# Patient Record
Sex: MALE | Race: OTHER | HISPANIC OR LATINO
[De-identification: names, ages, dates, MRNs, and addresses within clinical notes are randomized per-mention and may not be internally consistent; named-entity substitution may affect disease eponyms.]

---

## 2018-04-27 ENCOUNTER — RESULT REVIEW (OUTPATIENT)
Age: 78
End: 2018-04-27

## 2019-04-05 ENCOUNTER — APPOINTMENT (OUTPATIENT)
Dept: NEUROLOGY | Facility: CLINIC | Age: 79
End: 2019-04-05
Payer: MEDICARE

## 2019-04-05 VITALS
HEIGHT: 66 IN | DIASTOLIC BLOOD PRESSURE: 64 MMHG | WEIGHT: 172 LBS | BODY MASS INDEX: 27.64 KG/M2 | SYSTOLIC BLOOD PRESSURE: 120 MMHG | HEART RATE: 70 BPM

## 2019-04-05 DIAGNOSIS — Z78.9 OTHER SPECIFIED HEALTH STATUS: ICD-10-CM

## 2019-04-05 DIAGNOSIS — E78.00 PURE HYPERCHOLESTEROLEMIA, UNSPECIFIED: ICD-10-CM

## 2019-04-05 DIAGNOSIS — E11.9 TYPE 2 DIABETES MELLITUS W/OUT COMPLICATIONS: ICD-10-CM

## 2019-04-05 DIAGNOSIS — Z82.0 FAMILY HISTORY OF EPILEPSY AND OTHER DISEASES OF THE NERVOUS SYSTEM: ICD-10-CM

## 2019-04-05 DIAGNOSIS — Z82.3 FAMILY HISTORY OF STROKE: ICD-10-CM

## 2019-04-05 DIAGNOSIS — G31.84 MILD COGNITIVE IMPAIRMENT, SO STATED: ICD-10-CM

## 2019-04-05 DIAGNOSIS — I10 ESSENTIAL (PRIMARY) HYPERTENSION: ICD-10-CM

## 2019-04-05 PROCEDURE — 99204 OFFICE O/P NEW MOD 45 MIN: CPT

## 2019-04-05 RX ORDER — METFORMIN HYDROCHLORIDE 1000 MG/1
1000 TABLET, COATED ORAL
Refills: 0 | Status: ACTIVE | COMMUNITY

## 2019-04-05 RX ORDER — BISOPROLOL/HYDROCHLOROTHIAZIDE 2.5-6.25MG
2.5-6.25 TABLET ORAL
Refills: 0 | Status: ACTIVE | COMMUNITY

## 2019-04-05 RX ORDER — LOSARTAN POTASSIUM 100 MG/1
100 TABLET, FILM COATED ORAL
Refills: 0 | Status: ACTIVE | COMMUNITY

## 2019-04-05 NOTE — REASON FOR VISIT
[Consultation] : a consultation visit [Other: _____] : [unfilled] [FreeTextEntry1] : Referred by Dr. Rivera for evaluation of cognitive decline

## 2019-04-05 NOTE — DISCUSSION/SUMMARY
[FreeTextEntry1] : 79 -year-old male with PMH remarkable for hypertension, hypercholesterolemia, type 2 diabetes presents with c/o mild cognitive decline and slowing down both cognitively and of motor activity, takes longer time to finish tasks, son reports that he has had urinary incontinence since past 6 months.\par \par \par # Mild cognitive impairment/possibility of early dementia: MMSE 25/30\par \par - Have recommended labs: B12, folate and TSH\par - Obtain MRI of the brain\par - Perform full cognitive assessment in 6 weeks, we will make further recommendations after that\par \par # Very subtle parkinsonian features, at present examination not suggestive of Parkinson's disease.\par \par - Have recommended periodic Q3 month followups note evolution of symptoms\par - Patient advised about activities, should walk daily\par - Also recommended cognitive exercises and staying active in his physical and social active

## 2019-04-05 NOTE — PHYSICAL EXAM
[General Appearance - Alert] : alert [General Appearance - In No Acute Distress] : in no acute distress [Mood] : the mood was normal [Person] : oriented to person [Place] : oriented to place [Time] : disoriented to time [Short Term Intact] : short term memory impaired [Registration Intact] : recent registration memory intact [Span Intact] : the attention span was normal [Naming Objects] : no difficulty naming common objects [Repeating Phrases] : no difficulty repeating a phrase [Fluency] : fluency intact [Comprehension] : comprehension intact [Current Events] : inadequate knowledge of current events [Total Score ___ / 30] : the patient achieved a score of [unfilled] /30 [Date / Time ___ / 5] : date / time [unfilled] / 5 [Place ___ / 5] : place [unfilled] / 5 [Registration ___ / 3] : registration [unfilled] / 3 [Serial Sevens ___/5] : serial sevens [unfilled] / 5 [Naming 2 Objects ___ / 2] : naming two objects [unfilled] / 2 [Repeating a Sentence ___ / 1] : repeating a sentence [unfilled] / 1 [Writing a Sentence ___ / 1] : write sentence [unfilled] / 1 [3-stage Verbal Command ___ / 3] : three-stage verbal command [unfilled] / 3 [Written Command ___ / 1] : written command [unfilled] / 1 [Copy a Design ___ / 1] : copy a design [unfilled] / 1 [Recall ___ / 3] : recall [unfilled] / 3 [Cranial Nerves Optic (II)] : visual acuity intact bilaterally,  visual fields full to confrontation, pupils equal round and reactive to light [Cranial Nerves Oculomotor (III)] : extraocular motion intact [Cranial Nerves Trigeminal (V)] : facial sensation intact symmetrically [Cranial Nerves Facial (VII)] : face symmetrical [Cranial Nerves Vestibulocochlear (VIII)] : hearing was intact bilaterally [Cranial Nerves Glossopharyngeal (IX)] : tongue and palate midline [Cranial Nerves Accessory (XI - Cranial And Spinal)] : head turning and shoulder shrug symmetric [Motor Strength] : muscle strength was normal in all four extremities [Sensation Tactile Decrease] : light touch was intact [Romberg's Sign] : Romberg's sign was negtive [Vibration Decrease Leg / Foot Both Ankles] : decreased at both ankles [Vibration Decrease Leg / Foot Toes Both Feet] : decreased at the toes of both feet  [2+] : Brachioradialis left 2+ [1+] : Patella left 1+ [0] : Ankle jerk left 0 [FreeTextEntry1] : Subtle mask-like facies, no hypophonia or dysarthria, no axial muscle hypertonia.\par Mild-moderate hypertonia/rigidity left upper extremity, ? Bradykinesia left side, finger tapping equal.\par No rest or action tremors noted.\par Erect posture and normal gait, no asymmetry of arm swing [PERRL With Normal Accommodation] : pupils were equal in size, round, reactive to light, with normal accommodation [Extraocular Movements] : extraocular movements were intact [No APD] : no afferent pupillary defect [Full Visual Field] : full visual field [Outer Ear] : the ears and nose were normal in appearance [Hearing Threshold Finger Rub Not Thomas] : hearing was normal [Auscultation Breath Sounds / Voice Sounds] : lungs were clear to auscultation bilaterally [Heart Sounds] : normal S1 and S2 [Arterial Pulses Carotid] : carotid pulses were normal with no bruits [Edema] : there was no peripheral edema [No Spinal Tenderness] : no spinal tenderness [Involuntary Movements] : no involuntary movements were seen [] : no rash

## 2019-04-05 NOTE — HISTORY OF PRESENT ILLNESS
[FreeTextEntry1] : 79 -year-old male with PMH remarkable for hypertension, hypercholesterolemia and type 2 diabetes presents today for evaluation of mild cognitive decline noted since over a year.\par \par Patient states that he has not noted any change in decline in his cognition or functioning, he states he keeps himself busy; patient's son who is accompanying him reports the patient has had short-term memory problems, he repeats questions and gives same on source, he has also slowed down both cognitively as well as in his motor activity, takes longer time to finish tasks, son reports that he has had urinary incontinence since past 6 months.\par \par Patient denies sleep disturbance, nightmares/hallucinations, tremors, dysphagia, and changing speech or language, gait instability or falls.
asymptomatic

## 2019-04-05 NOTE — REVIEW OF SYSTEMS
[Recent Weight Gain (___ Lbs)] : recent [unfilled] ~Ulb weight gain [As Noted in HPI] : as noted in HPI [Memory Lapses or Loss] : memory loss [Decr. Concentrating Ability] : decreased concentrating ability [Repeating Questions] : repeated questioning about recent events [Poor Coordination] : poor coordination [Incontinence] : incontinence [Negative] : Heme/Lymph [FreeTextEntry4] : snoring

## 2019-04-10 ENCOUNTER — FORM ENCOUNTER (OUTPATIENT)
Age: 79
End: 2019-04-10

## 2019-04-11 ENCOUNTER — OUTPATIENT (OUTPATIENT)
Dept: OUTPATIENT SERVICES | Facility: HOSPITAL | Age: 79
LOS: 1 days | End: 2019-04-11
Payer: MEDICARE

## 2019-04-11 ENCOUNTER — APPOINTMENT (OUTPATIENT)
Dept: MRI IMAGING | Facility: CLINIC | Age: 79
End: 2019-04-11
Payer: MEDICARE

## 2019-04-11 DIAGNOSIS — Z00.8 ENCOUNTER FOR OTHER GENERAL EXAMINATION: ICD-10-CM

## 2019-04-11 PROCEDURE — 70551 MRI BRAIN STEM W/O DYE: CPT

## 2019-04-11 PROCEDURE — 70551 MRI BRAIN STEM W/O DYE: CPT | Mod: 26

## 2019-05-10 ENCOUNTER — APPOINTMENT (OUTPATIENT)
Dept: NEUROLOGY | Facility: CLINIC | Age: 79
End: 2019-05-10
Payer: MEDICARE

## 2019-05-10 VITALS
DIASTOLIC BLOOD PRESSURE: 60 MMHG | SYSTOLIC BLOOD PRESSURE: 120 MMHG | HEIGHT: 66 IN | BODY MASS INDEX: 26.84 KG/M2 | HEART RATE: 64 BPM | WEIGHT: 167 LBS

## 2019-05-10 PROCEDURE — 96132 NRPSYC TST EVAL PHYS/QHP 1ST: CPT | Mod: 59

## 2019-05-10 PROCEDURE — 99214 OFFICE O/P EST MOD 30 MIN: CPT | Mod: 25

## 2019-05-10 NOTE — DATA REVIEWED
[de-identified] : 4/11/19; MRI of brain - normal [de-identified] : 5/3/19: Total cholest 173, , HDL 45, , CMP, B12, TSH, vitamin D normal\par HbA1c 6.7

## 2019-05-10 NOTE — PROCEDURE
[FreeTextEntry1] :  Cognitive assessment ; global battery performed today.\par \par Global Cognitive score: 93.6\par \par More than one standard deviation below average in domains: visuospatial function 74.8, verbal function 81.3\par \par Below average in domains: Memory 85.4, attention 90.3,\par \par Above average in domains: executive func 107.6, information processing speed 100.3, motor skills 115.3

## 2019-05-10 NOTE — HISTORY OF PRESENT ILLNESS
[FreeTextEntry1] : Patient is here for a followup visit today, was last seen on 4/5/19. Patient continues to have issues with short-term memory, has slowed down, has no new complaints since last visit. Pts wife who is accompanying him, is concerned as he is working full-time, she would like him to cut down on his work and spend more time in [place] where she spends winter months and can supervise him, patient is reluctant, he stays in [place] during [place] with his son.\par \par MRI of brain was normal;\par labs B12, folate, TSH normal, A1c 6.7\par \par Patient is here for cognitive assessment today\par

## 2019-05-10 NOTE — DISCUSSION/SUMMARY
[FreeTextEntry1] : 79 -year-old male with PMH remarkable for hypertension, hypercholesterolemia, type 2 diabetes presents with c/o mild cognitive decline and slowing down both cognitively and of motor activity, takes longer time to finish tasks, son reports that he has had urinary incontinence since past 6 months.\par \par Cognitive assessment reveals global cognitive score 93.6 ; more than one SD below average noted in visuospatial function and verbal function, Below average in Memory and attention \par \par # Mild dementia; had a detailed discussion with the patient and his wife regarding cognitive assessment, he is willing to try donepezil, adverse effects of the medication discussed with him.\par \par - Start donepezil 5 mg daily, may increase dose to 10 mg in 10 weeks\par \par # Very subtle parkinsonian features, at present examination not suggestive of Parkinson's disease.\par \par - Have recommended periodic Q3 month followups note evolution of symptoms\par - Patient advised about activities, should walk daily\par - Also recommended cognitive exercises and staying active in his physical and social active

## 2019-05-10 NOTE — REASON FOR VISIT
[Procedure: _________] : a [unfilled] procedure visit [FreeTextEntry1] : to discuss MRI brain and lab work; for cognitive assessment

## 2019-05-10 NOTE — PHYSICAL EXAM
[General Appearance - Alert] : alert [General Appearance - In No Acute Distress] : in no acute distress [Mood] : the mood was normal [Person] : oriented to person [Place] : oriented to place [Span Intact] : the attention span was normal [Registration Intact] : recent registration memory intact [Naming Objects] : no difficulty naming common objects [Repeating Phrases] : no difficulty repeating a phrase [Comprehension] : comprehension intact [Fluency] : fluency intact [Total Score ___ / 30] : the patient achieved a score of [unfilled] /30 [Date / Time ___ / 5] : date / time [unfilled] / 5 [Registration ___ / 3] : registration [unfilled] / 3 [Place ___ / 5] : place [unfilled] / 5 [Serial Sevens ___/5] : serial sevens [unfilled] / 5 [Naming 2 Objects ___ / 2] : naming two objects [unfilled] / 2 [Repeating a Sentence ___ / 1] : repeating a sentence [unfilled] / 1 [Writing a Sentence ___ / 1] : write sentence [unfilled] / 1 [3-stage Verbal Command ___ / 3] : three-stage verbal command [unfilled] / 3 [Written Command ___ / 1] : written command [unfilled] / 1 [Copy a Design ___ / 1] : copy a design [unfilled] / 1 [Recall ___ / 3] : recall [unfilled] / 3 [Cranial Nerves Optic (II)] : visual acuity intact bilaterally,  visual fields full to confrontation, pupils equal round and reactive to light [Cranial Nerves Oculomotor (III)] : extraocular motion intact [Cranial Nerves Trigeminal (V)] : facial sensation intact symmetrically [Cranial Nerves Facial (VII)] : face symmetrical [Cranial Nerves Vestibulocochlear (VIII)] : hearing was intact bilaterally [Cranial Nerves Glossopharyngeal (IX)] : tongue and palate midline [Motor Strength] : muscle strength was normal in all four extremities [Cranial Nerves Accessory (XI - Cranial And Spinal)] : head turning and shoulder shrug symmetric [Sensation Tactile Decrease] : light touch was intact [Vibration Decrease Leg / Foot Both Ankles] : decreased at both ankles [Vibration Decrease Leg / Foot Toes Both Feet] : decreased at the toes of both feet  [2+] : Brachioradialis left 2+ [1+] : Patella left 1+ [0] : Ankle jerk left 0 [Short Term Intact] : short term memory impaired [Time] : disoriented to time [Romberg's Sign] : Romberg's sign was negtive [Current Events] : inadequate knowledge of current events [FreeTextEntry1] : Subtle mask-like facies, no hypophonia or dysarthria, no axial muscle hypertonia.\par Mild-moderate hypertonia/rigidity left upper extremity, ? Bradykinesia left side, finger tapping equal.\par No rest or action tremors noted.\par Erect posture and normal gait, no asymmetry of arm swing

## 2019-07-29 ENCOUNTER — RX RENEWAL (OUTPATIENT)
Age: 79
End: 2019-07-29

## 2019-08-05 ENCOUNTER — APPOINTMENT (OUTPATIENT)
Dept: NEUROLOGY | Facility: CLINIC | Age: 79
End: 2019-08-05
Payer: MEDICARE

## 2019-08-05 VITALS
HEIGHT: 66 IN | HEART RATE: 60 BPM | BODY MASS INDEX: 27.64 KG/M2 | DIASTOLIC BLOOD PRESSURE: 64 MMHG | SYSTOLIC BLOOD PRESSURE: 118 MMHG | WEIGHT: 172 LBS

## 2019-08-05 PROCEDURE — 99214 OFFICE O/P EST MOD 30 MIN: CPT

## 2019-08-05 NOTE — DATA REVIEWED
[de-identified] : 4/11/19; MRI of brain - normal [de-identified] : 5/3/19: Total cholest 173, , HDL 45, , CMP, B12, TSH, vitamin D normal\par HbA1c 6.7 [de-identified] : 5/10/19: Cognitive assessment ; global battery.\par Global Cognitive score: 93.6\par More than one SD below average in domains: visuospatial function 74.8, verbal function 81.3\par Below average in domains: Memory 85.4, attention 90.3,\par Above average in domains: executive func 107.6, information processing speed 100.3, motor skills 115.3

## 2019-08-05 NOTE — HISTORY OF PRESENT ILLNESS
[FreeTextEntry1] : Patient is here for a followup visit today, was last seen on 5/10/19. Patient was started on donepezil 5 mg daily, he has been tolerating the medication well, has not noted any adverse effect, as per his wife, he is active, is gardening and stays busy. No significant change or decline noted in his cognitive or day-to-day function.

## 2019-08-05 NOTE — REVIEW OF SYSTEMS
[Recent Weight Gain (___ Lbs)] : recent [unfilled] ~Ulb weight gain [Memory Lapses or Loss] : memory loss [As Noted in HPI] : as noted in HPI [Repeating Questions] : repeated questioning about recent events [Decr. Concentrating Ability] : decreased concentrating ability [Poor Coordination] : poor coordination [Incontinence] : incontinence [Negative] : Heme/Lymph [FreeTextEntry4] : snoring

## 2019-08-05 NOTE — PHYSICAL EXAM
[General Appearance - Alert] : alert [General Appearance - In No Acute Distress] : in no acute distress [Mood] : the mood was normal [Person] : oriented to person [Place] : oriented to place [Registration Intact] : recent registration memory intact [Span Intact] : the attention span was normal [Naming Objects] : no difficulty naming common objects [Repeating Phrases] : no difficulty repeating a phrase [Fluency] : fluency intact [Comprehension] : comprehension intact [Total Score ___ / 30] : the patient achieved a score of [unfilled] /30 [Place ___ / 5] : place [unfilled] / 5 [Date / Time ___ / 5] : date / time [unfilled] / 5 [Registration ___ / 3] : registration [unfilled] / 3 [Serial Sevens ___/5] : serial sevens [unfilled] / 5 [Naming 2 Objects ___ / 2] : naming two objects [unfilled] / 2 [Repeating a Sentence ___ / 1] : repeating a sentence [unfilled] / 1 [Writing a Sentence ___ / 1] : write sentence [unfilled] / 1 [3-stage Verbal Command ___ / 3] : three-stage verbal command [unfilled] / 3 [Written Command ___ / 1] : written command [unfilled] / 1 [Copy a Design ___ / 1] : copy a design [unfilled] / 1 [Recall ___ / 3] : recall [unfilled] / 3 [Cranial Nerves Optic (II)] : visual acuity intact bilaterally,  visual fields full to confrontation, pupils equal round and reactive to light [Cranial Nerves Oculomotor (III)] : extraocular motion intact [Cranial Nerves Facial (VII)] : face symmetrical [Cranial Nerves Trigeminal (V)] : facial sensation intact symmetrically [Cranial Nerves Vestibulocochlear (VIII)] : hearing was intact bilaterally [Cranial Nerves Glossopharyngeal (IX)] : tongue and palate midline [Cranial Nerves Accessory (XI - Cranial And Spinal)] : head turning and shoulder shrug symmetric [Motor Strength] : muscle strength was normal in all four extremities [Sensation Tactile Decrease] : light touch was intact [Vibration Decrease Leg / Foot Toes Both Feet] : decreased at the toes of both feet  [Vibration Decrease Leg / Foot Both Ankles] : decreased at both ankles [2+] : Brachioradialis right 2+ [1+] : Patella left 1+ [0] : Ankle jerk left 0 [Time] : disoriented to time [Short Term Intact] : short term memory impaired [Current Events] : inadequate knowledge of current events [Romberg's Sign] : Romberg's sign was negtive [FreeTextEntry1] : Subtle mask-like facies, no hypophonia or dysarthria, no axial muscle hypertonia.\par Mild-moderate hypertonia/rigidity left upper extremity, ? Bradykinesia left side, finger tapping equal.\par No rest or action tremors noted.\par Erect posture and normal gait, no asymmetry of arm swing

## 2019-08-05 NOTE — DISCUSSION/SUMMARY
[FreeTextEntry1] : 79 -year-old male with PMH remarkable for hypertension, hypercholesterolemia, type 2 diabetes presents with c/o mild cognitive decline and slowing down both cognitively and of motor activity, takes longer time to finish tasks, son reports that he has had urinary incontinence since past 6 months.\par \par Cognitive assessment reveals global cognitive score 93.6 ; more than one SD below average noted in visuospatial function and verbal function, Below average in Memory and attention \par \par # Mild dementia; tolerating Donepezil 5 MG daily well, no adverse effects.\par \par - Increase donepezil 10 mg daily\par \par # Very subtle parkinsonian features, at present examination not suggestive of Parkinson's disease.\par \par - Have recommended periodic Q3 month follow-ups note evolution of symptoms\par - Patient advised about activities, should walk daily\par - Also recommended cognitive exercises and staying active in his physical and social active

## 2019-08-28 ENCOUNTER — RX RENEWAL (OUTPATIENT)
Age: 79
End: 2019-08-28

## 2019-12-09 ENCOUNTER — APPOINTMENT (OUTPATIENT)
Dept: NEUROLOGY | Facility: CLINIC | Age: 79
End: 2019-12-09
Payer: MEDICARE

## 2019-12-09 VITALS
WEIGHT: 160 LBS | HEART RATE: 62 BPM | SYSTOLIC BLOOD PRESSURE: 112 MMHG | DIASTOLIC BLOOD PRESSURE: 66 MMHG | BODY MASS INDEX: 25.71 KG/M2 | HEIGHT: 66 IN

## 2019-12-09 PROCEDURE — 99214 OFFICE O/P EST MOD 30 MIN: CPT

## 2019-12-09 RX ORDER — DONEPEZIL HYDROCHLORIDE 5 MG/1
5 TABLET ORAL DAILY
Qty: 30 | Refills: 2 | Status: DISCONTINUED | COMMUNITY
Start: 2019-05-10 | End: 2019-12-09

## 2019-12-09 NOTE — REVIEW OF SYSTEMS
[As Noted in HPI] : as noted in HPI [Memory Lapses or Loss] : memory loss [Decr. Concentrating Ability] : decreased concentrating ability [Repeating Questions] : repeated questioning about recent events [Poor Coordination] : poor coordination [Incontinence] : incontinence [Negative] : Endocrine [FreeTextEntry4] : snoring [Recent Weight Gain (___ Lbs)] : no recent weight gain

## 2019-12-09 NOTE — DATA REVIEWED
[de-identified] : 4/11/19; MRI of brain - normal [de-identified] : 5/10/19: Cognitive assessment ; global battery.\par Global Cognitive score: 93.6\par More than one SD below average in domains: visuospatial function 74.8, verbal function 81.3\par Below average in domains: Memory 85.4, attention 90.3,\par Above average in domains: executive func 107.6, information processing speed 100.3, motor skills 115.3 [de-identified] : 5/3/19: Total cholest 173, , HDL 45, , CMP, B12, TSH, vitamin D normal\par HbA1c 6.7

## 2019-12-09 NOTE — PHYSICAL EXAM
[General Appearance - Alert] : alert [General Appearance - In No Acute Distress] : in no acute distress [Mood] : the mood was normal [Person] : oriented to person [Place] : oriented to place [Span Intact] : the attention span was normal [Naming Objects] : no difficulty naming common objects [Registration Intact] : recent registration memory intact [Repeating Phrases] : no difficulty repeating a phrase [Fluency] : fluency intact [Comprehension] : comprehension intact [Total Score ___ / 30] : the patient achieved a score of [unfilled] /30 [Date / Time ___ / 5] : date / time [unfilled] / 5 [Place ___ / 5] : place [unfilled] / 5 [Registration ___ / 3] : registration [unfilled] / 3 [Serial Sevens ___/5] : serial sevens [unfilled] / 5 [Naming 2 Objects ___ / 2] : naming two objects [unfilled] / 2 [Repeating a Sentence ___ / 1] : repeating a sentence [unfilled] / 1 [Writing a Sentence ___ / 1] : write sentence [unfilled] / 1 [3-stage Verbal Command ___ / 3] : three-stage verbal command [unfilled] / 3 [Written Command ___ / 1] : written command [unfilled] / 1 [Recall ___ / 3] : recall [unfilled] / 3 [Copy a Design ___ / 1] : copy a design [unfilled] / 1 [Cranial Nerves Optic (II)] : visual acuity intact bilaterally,  visual fields full to confrontation, pupils equal round and reactive to light [Cranial Nerves Trigeminal (V)] : facial sensation intact symmetrically [Cranial Nerves Oculomotor (III)] : extraocular motion intact [Cranial Nerves Accessory (XI - Cranial And Spinal)] : head turning and shoulder shrug symmetric [Cranial Nerves Glossopharyngeal (IX)] : tongue and palate midline [Cranial Nerves Facial (VII)] : face symmetrical [Cranial Nerves Vestibulocochlear (VIII)] : hearing was intact bilaterally [Motor Strength] : muscle strength was normal in all four extremities [Sensation Tactile Decrease] : light touch was intact [Vibration Decrease Leg / Foot Toes Both Feet] : decreased at the toes of both feet  [Vibration Decrease Leg / Foot Both Ankles] : decreased at both ankles [2+] : Biceps left 2+ [1+] : Patella right 1+ [0] : Ankle jerk left 0 [Short Term Intact] : short term memory impaired [Time] : disoriented to time [Current Events] : inadequate knowledge of current events [Romberg's Sign] : Romberg's sign was negtive [FreeTextEntry1] : Subtle mask-like facies, no hypophonia or dysarthria, no axial muscle hypertonia.\par Mild-moderate hypertonia/rigidity left upper extremity, ? Bradykinesia left side, finger tapping equal.\par No rest or action tremors noted.\par Erect posture and normal gait, no asymmetry of arm swing

## 2019-12-09 NOTE — DISCUSSION/SUMMARY
[FreeTextEntry1] : 79 -year-old male with PMH remarkable for hypertension, hypercholesterolemia, type 2 diabetes presents with c/o mild cognitive decline and slowing down both cognitively and of motor activity, takes longer time to finish tasks, son reports that he has had urinary incontinence since past 6 months.\par \par # Mild dementia; Cognitive test reveals global cognitive score 93.6 ; > one SD below average noted in visuospatial and verbal function, Below average in Memory and attention. Pt started on Donepezil well.\par \par - continue donepezil 10 mg daily\par \par # Very subtle parkinsonian features, at present examination not suggestive of Parkinson's disease.\par \par - Have recommended periodic Q4 month follow-ups note evolution of symptoms\par - Patient advised about activities, should walk daily\par - Also recommended cognitive exercises and staying active in his physical and social active

## 2019-12-09 NOTE — HISTORY OF PRESENT ILLNESS
[FreeTextEntry1] : Patient is here for a followup visit today, was last seen on 8/5/19. Patient is donepezil 10 mg daily, he has been tolerating the medication well, has not noted any adverse effect, he reports he is active stays busy, has not noted any change or decline in his cognitive or day-to-day functioning.\par \par Patient denies gait or postural instability, denies tremors of hands, denies sleep disturbance.

## 2020-03-02 ENCOUNTER — RX RENEWAL (OUTPATIENT)
Age: 80
End: 2020-03-02

## 2020-12-15 ENCOUNTER — APPOINTMENT (OUTPATIENT)
Dept: NEUROLOGY | Facility: CLINIC | Age: 80
End: 2020-12-15
Payer: MEDICARE

## 2020-12-15 VITALS
HEIGHT: 66 IN | HEART RATE: 72 BPM | DIASTOLIC BLOOD PRESSURE: 70 MMHG | WEIGHT: 155 LBS | TEMPERATURE: 97.8 F | SYSTOLIC BLOOD PRESSURE: 110 MMHG | BODY MASS INDEX: 24.91 KG/M2

## 2020-12-15 PROCEDURE — 99214 OFFICE O/P EST MOD 30 MIN: CPT

## 2020-12-15 NOTE — HISTORY OF PRESENT ILLNESS
[FreeTextEntry1] : Pt is here for follow up visit today, seen on 12/9/19. Patient's wife reports that he is stable continues to have problems with short-term memory, she also states that he sleeps a lot and is inactive, Gait is mildly unstable but he has not had any falls,  denies postural instability, tremors of hands, or sleep disturbance.\par \par Patient is donepezil 10 mg daily, he has been tolerating the medication well, has not noted any adverse effect, no decline in his day-to-day functioning.\par \par Patient denies gait or postural instability, denies tremors of hands, denies sleep disturbance. \par  \par

## 2020-12-15 NOTE — REVIEW OF SYSTEMS
[As Noted in HPI] : as noted in HPI [Memory Lapses or Loss] : memory loss [Decr. Concentrating Ability] : decreased concentrating ability [Repeating Questions] : repeated questioning about recent events [Poor Coordination] : poor coordination [Incontinence] : incontinence [Negative] : Heme/Lymph [Recent Weight Loss (___ Lbs)] : recent [unfilled] ~Ulb weight loss [Diarrhea] : diarrhea [Recent Weight Gain (___ Lbs)] : no recent weight gain [FreeTextEntry4] : snoring

## 2020-12-15 NOTE — PHYSICAL EXAM
[General Appearance - Alert] : alert [General Appearance - In No Acute Distress] : in no acute distress [Mood] : the mood was normal [Person] : oriented to person [Place] : oriented to place [Registration Intact] : recent registration memory intact [Span Intact] : the attention span was normal [Naming Objects] : no difficulty naming common objects [Repeating Phrases] : no difficulty repeating a phrase [Fluency] : fluency intact [Comprehension] : comprehension intact [Cranial Nerves Optic (II)] : visual acuity intact bilaterally,  visual fields full to confrontation, pupils equal round and reactive to light [Cranial Nerves Oculomotor (III)] : extraocular motion intact [Cranial Nerves Trigeminal (V)] : facial sensation intact symmetrically [Cranial Nerves Facial (VII)] : face symmetrical [Cranial Nerves Vestibulocochlear (VIII)] : hearing was intact bilaterally [Cranial Nerves Glossopharyngeal (IX)] : tongue and palate midline [Cranial Nerves Accessory (XI - Cranial And Spinal)] : head turning and shoulder shrug symmetric [Motor Strength] : muscle strength was normal in all four extremities [Sensation Tactile Decrease] : light touch was intact [Vibration Decrease Leg / Foot Both Ankles] : decreased at both ankles [Vibration Decrease Leg / Foot Toes Both Feet] : decreased at the toes of both feet  [2+] : Brachioradialis left 2+ [1+] : Patella left 1+ [0] : Ankle jerk left 0 [PERRL With Normal Accommodation] : pupils were equal in size, round, reactive to light, with normal accommodation [Extraocular Movements] : extraocular movements were intact [Full Visual Field] : full visual field [Hearing Threshold Finger Rub Not [place]] : hearing was normal [] : the neck was supple [Neck Cervical Mass (___cm)] : no neck mass was observed [Auscultation Breath Sounds / Voice Sounds] : lungs were clear to auscultation bilaterally [Heart Sounds] : normal S1 and S2 [Arterial Pulses Carotid] : carotid pulses were normal with no bruits [Edema] : there was no peripheral edema [Time] : disoriented to time [Short Term Intact] : short term memory impaired [Current Events] : inadequate knowledge of current events [Romberg's Sign] : Romberg's sign was negtive [FreeTextEntry1] : Subtle mask-like facies, no hypophonia or dysarthria, no axial muscle hypertonia.\par Mild-moderate hypertonia/rigidity left upper extremity, ? Bradykinesia left side, finger tapping equal.\par No rest or action tremors noted.\par Erect posture and normal gait, no asymmetry of arm swing

## 2020-12-15 NOTE — REASON FOR VISIT
[Follow-Up: _____] : a [unfilled] follow-up visit [Spouse] : spouse [FreeTextEntry1] : for PD / memory decline

## 2020-12-15 NOTE — DISCUSSION/SUMMARY
[FreeTextEntry1] : 80 -year-old male with PMH remarkable for hypertension, hypercholesterolemia, type 2 diabetes presents with c/o mild cognitive decline and slowing down both cognitively and of motor activity, takes longer time to finish tasks, son reports that he has had urinary incontinence since past 6 months.\par \par # Mild dementia; Cognitive test reveals global cognitive score 93.6 ; > one SD below average noted in visuospatial and verbal function, Below average in Memory and attention. Pt started on Donepezil well.\par \par - continue donepezil 10 mg daily\par - f/u cog test in 6 months\par \par # Very subtle parkinsonian features, at present examination not suggestive of Parkinson's disease.\par \par - Have recommended periodic Q4 month follow-ups note evolution of symptoms\par - Patient advised about activities, should walk daily\par - Also recommended cognitive exercises and staying active in his physical and social active

## 2020-12-15 NOTE — DATA REVIEWED
[de-identified] : 4/11/19; MRI of brain - normal [de-identified] : 5/10/19: Cognitive assessment ; global battery.\par Global Cognitive score: 93.6\par More than one SD below average in domains: visuospatial function 74.8, verbal function 81.3\par Below average in domains: Memory 85.4, attention 90.3,\par Above average in domains: executive func 107.6, information processing speed 100.3, motor skills 115.3 [de-identified] : 5/3/19: Total cholest 173, , HDL 45, , CMP, B12, TSH, vitamin D normal\par HbA1c 6.7

## 2020-12-18 ENCOUNTER — APPOINTMENT (OUTPATIENT)
Dept: GASTROENTEROLOGY | Facility: CLINIC | Age: 80
End: 2020-12-18
Payer: MEDICARE

## 2020-12-18 VITALS
HEART RATE: 61 BPM | HEIGHT: 67 IN | WEIGHT: 155 LBS | TEMPERATURE: 97 F | BODY MASS INDEX: 24.33 KG/M2 | SYSTOLIC BLOOD PRESSURE: 116 MMHG | DIASTOLIC BLOOD PRESSURE: 74 MMHG

## 2020-12-18 DIAGNOSIS — R63.4 ABNORMAL WEIGHT LOSS: ICD-10-CM

## 2020-12-18 DIAGNOSIS — R68.81 EARLY SATIETY: ICD-10-CM

## 2020-12-18 PROCEDURE — 99202 OFFICE O/P NEW SF 15 MIN: CPT

## 2020-12-18 RX ORDER — ATORVASTATIN CALCIUM 10 MG/1
10 TABLET, FILM COATED ORAL
Qty: 90 | Refills: 0 | Status: ACTIVE | COMMUNITY
Start: 2020-08-27

## 2020-12-18 NOTE — HISTORY OF PRESENT ILLNESS
[de-identified] : Mr. KAMRAN PAZ is a 80 year old male with history of regurgitation associated with weight loss of 25 pounds. Patient denies any heartburn or true dysphagia. Patient does note some early satiety. History is somewhat limited.\par

## 2020-12-18 NOTE — ASSESSMENT
[FreeTextEntry1] : 81 yo male with history of weight loss and early satiety. Will arrange for upper endoscopy.

## 2021-01-31 DIAGNOSIS — Z01.818 ENCOUNTER FOR OTHER PREPROCEDURAL EXAMINATION: ICD-10-CM

## 2021-02-01 ENCOUNTER — APPOINTMENT (OUTPATIENT)
Dept: DISASTER EMERGENCY | Facility: CLINIC | Age: 81
End: 2021-02-01

## 2021-02-02 ENCOUNTER — TRANSCRIPTION ENCOUNTER (OUTPATIENT)
Age: 81
End: 2021-02-02

## 2021-02-04 ENCOUNTER — RESULT REVIEW (OUTPATIENT)
Age: 81
End: 2021-02-04

## 2021-02-04 ENCOUNTER — APPOINTMENT (OUTPATIENT)
Dept: GASTROENTEROLOGY | Facility: AMBULATORY MEDICAL SERVICES | Age: 81
End: 2021-02-04
Payer: MEDICARE

## 2021-02-04 PROCEDURE — 43239 EGD BIOPSY SINGLE/MULTIPLE: CPT

## 2021-02-09 DIAGNOSIS — A04.8 OTHER SPECIFIED BACTERIAL INTESTINAL INFECTIONS: ICD-10-CM

## 2021-07-13 ENCOUNTER — APPOINTMENT (OUTPATIENT)
Dept: NEUROLOGY | Facility: CLINIC | Age: 81
End: 2021-07-13
Payer: MEDICARE

## 2021-07-13 VITALS
TEMPERATURE: 97.2 F | HEART RATE: 60 BPM | WEIGHT: 158 LBS | HEIGHT: 67 IN | DIASTOLIC BLOOD PRESSURE: 69 MMHG | SYSTOLIC BLOOD PRESSURE: 110 MMHG | BODY MASS INDEX: 24.8 KG/M2

## 2021-07-13 PROCEDURE — 99214 OFFICE O/P EST MOD 30 MIN: CPT

## 2021-07-13 NOTE — HISTORY OF PRESENT ILLNESS
[FreeTextEntry1] : Pt is here for follow up visit today, seen on 12/9/19. Patient's wife reports that they spent 6 months in [place], return 2 weeks ago, she reports patient was sedentary most of the time while in [place], occasionally did little bit of gardening, he Sleeps a lot, after getting up and in the morning he eats breakfast was back to sleep, again after eating lunch takes a nap. Pt refutes this claim but does admit that he likes to sleep, he does admit that he has no motivation to do anything else or go outdoors, lacks energy.\par \par He continues to have problems with short-term memory, gait is mildly unstable but he has not had any falls,  denies postural instability, tremors of hands, or sleep disturbance.\par \par Patient is donepezil 10 mg daily, he has been tolerating the medication well, has not noted any adverse effect, no decline in his day-to-day functioning.\par \par

## 2021-07-13 NOTE — REVIEW OF SYSTEMS
[Recent Weight Loss (___ Lbs)] : recent [unfilled] ~Ulb weight loss [As Noted in HPI] : as noted in HPI [Memory Lapses or Loss] : memory loss [Decr. Concentrating Ability] : decreased concentrating ability [Repeating Questions] : repeated questioning about recent events [Poor Coordination] : poor coordination [Diarrhea] : diarrhea [Incontinence] : incontinence [Negative] : Heme/Lymph [Sleep Disturbances] : sleep disturbances [Recent Weight Gain (___ Lbs)] : no recent weight gain [de-identified] : Hypersomnia [FreeTextEntry4] : snoring

## 2021-07-13 NOTE — DATA REVIEWED
[de-identified] : 4/11/19; MRI of brain - normal [de-identified] : 5/10/19: Cognitive assessment ; global battery.\par Global Cognitive score: 93.6\par More than one SD below average in domains: visuospatial function 74.8, verbal function 81.3\par Below average in domains: Memory 85.4, attention 90.3,\par Above average in domains: executive func 107.6, information processing speed 100.3, motor skills 115.3 [de-identified] : 5/3/19: Total cholesterol 173, , HDL 45, , CMP, B12, TSH, vitamin D normal\par HbA1c 6.7

## 2021-07-13 NOTE — PHYSICAL EXAM
[General Appearance - Alert] : alert [General Appearance - In No Acute Distress] : in no acute distress [Mood] : the mood was normal [Person] : oriented to person [Place] : oriented to place [Registration Intact] : recent registration memory intact [Span Intact] : the attention span was normal [Naming Objects] : no difficulty naming common objects [Repeating Phrases] : no difficulty repeating a phrase [Fluency] : fluency intact [Comprehension] : comprehension intact [Cranial Nerves Optic (II)] : visual acuity intact bilaterally,  visual fields full to confrontation, pupils equal round and reactive to light [Cranial Nerves Oculomotor (III)] : extraocular motion intact [Cranial Nerves Trigeminal (V)] : facial sensation intact symmetrically [Cranial Nerves Facial (VII)] : face symmetrical [Cranial Nerves Vestibulocochlear (VIII)] : hearing was intact bilaterally [Cranial Nerves Glossopharyngeal (IX)] : tongue and palate midline [Cranial Nerves Accessory (XI - Cranial And Spinal)] : head turning and shoulder shrug symmetric [Motor Strength] : muscle strength was normal in all four extremities [Sensation Tactile Decrease] : light touch was intact [Vibration Decrease Leg / Foot Both Ankles] : decreased at both ankles [Vibration Decrease Leg / Foot Toes Both Feet] : decreased at the toes of both feet  [2+] : Brachioradialis left 2+ [1+] : Patella left 1+ [0] : Ankle jerk left 0 [PERRL With Normal Accommodation] : pupils were equal in size, round, reactive to light, with normal accommodation [Extraocular Movements] : extraocular movements were intact [Full Visual Field] : full visual field [Hearing Threshold Finger Rub Not [place]] : hearing was normal [] : the neck was supple [Neck Cervical Mass (___cm)] : no neck mass was observed [Auscultation Breath Sounds / Voice Sounds] : lungs were clear to auscultation bilaterally [Heart Sounds] : normal S1 and S2 [Arterial Pulses Carotid] : carotid pulses were normal with no bruits [Edema] : there was no peripheral edema [Time] : disoriented to time [Short Term Intact] : short term memory impaired [Current Events] : inadequate knowledge of current events [Romberg's Sign] : Romberg's sign was negtive [FreeTextEntry1] : Subtle mask-like facies, no hypophonia or dysarthria, no axial muscle hypertonia.\par Mild-moderate hypertonia/rigidity left upper extremity, ? Bradykinesia left side, finger tapping equal.\par No rest or action tremors noted.\par Erect posture and normal gait, no asymmetry of arm swing

## 2021-07-13 NOTE — DISCUSSION/SUMMARY
[FreeTextEntry1] : 81 -year-old male with PMHx of HTN, HLD, DM2 with c/o mild cognitive decline and slowing down both cognitively and of motor activity, takes longer time to finish tasks, son reports that he has had urinary incontinence since past 6 months.\par \par # Mild dementia; Cognitive test reveals global cognitive score 93.6 ; > one SD below average noted in visuospatial and verbal function, Below average in Memory and attention. Pt started on Donepezil, tolerating it well.\par \par - continue donepezil 10 mg daily\par - f/u cog test in 2 months, consider adding memantine\par \par # Mild depression\par \par - Patient agreeable to trying escitalopram, will start 5 mg daily, if tolerated well go up to 10 mg in 8-10 weeks, adverse effects discussed with the patient and his wife.\par \par # Very subtle parkinsonian features, no significant progression.\par \par - Have recommended periodic Q3 month follow-ups note evolution of symptoms\par - Patient advised about activities, should walk daily\par - Also recommended cognitive exercises and staying active in his physical and social active

## 2021-10-04 ENCOUNTER — RX RENEWAL (OUTPATIENT)
Age: 81
End: 2021-10-04

## 2021-11-01 ENCOUNTER — APPOINTMENT (OUTPATIENT)
Dept: NEUROLOGY | Facility: CLINIC | Age: 81
End: 2021-11-01
Payer: MEDICARE

## 2021-11-01 VITALS
WEIGHT: 155 LBS | HEIGHT: 67 IN | SYSTOLIC BLOOD PRESSURE: 118 MMHG | DIASTOLIC BLOOD PRESSURE: 70 MMHG | HEART RATE: 62 BPM | TEMPERATURE: 97.5 F | BODY MASS INDEX: 24.33 KG/M2

## 2021-11-01 PROCEDURE — 99214 OFFICE O/P EST MOD 30 MIN: CPT | Mod: 25

## 2021-11-01 PROCEDURE — 96132 NRPSYC TST EVAL PHYS/QHP 1ST: CPT | Mod: 59

## 2021-11-01 NOTE — HISTORY OF PRESENT ILLNESS
[FreeTextEntry1] : Pt is here for follow up visit today, last seen on 7/13/21. Patient's wife reports that he sleeps a lot during daytime,  after eating lunch takes a nap, has less motivation to do anything else or go outdoors, He was started on escitalopram 5 mg daily at the last visit, he has noted his mood is slight upbeat since he has not had any adverse effects\par \par He continues to have problems with short-term memory, gait is mildly unstable but he has not had any falls,  denies postural instability, tremors of hands, or sleep disturbance. He was diagnosed with prostate CA recently. \par \par Patient is donepezil 10 mg daily, Is here for repeat cognitive testing.\par

## 2021-11-01 NOTE — DATA REVIEWED
[de-identified] : 4/11/19; MRI of brain - normal [de-identified] : 5/10/19: Cognitive assessment ; global battery.\par Global Cognitive score: 93.6\par More than one SD below average in domains: visuospatial function 74.8, verbal function 81.3\par Below average in domains: Memory 85.4, attention 90.3,\par Above average in domains: executive func 107.6, information processing speed 100.3, motor skills 115.3 [de-identified] : 5/3/19: Total cholesterol 173, , HDL 45, , CMP, B12, TSH, vitamin D normal\par HbA1c 6.7

## 2021-11-01 NOTE — PROCEDURE
[FreeTextEntry1] : 7/13/21: Cognitive assessment ; global battery \par Global Cognitive score: 84.8\par More than one SD below average in domains: Memory 82.9, executive func 81.5, attention 76.9 Below average in domains:  verbal function 97.1, information processing speed 95.6\par Above average in domains: None\par \par Visuospatial function, verbal function could not be assessed

## 2021-11-01 NOTE — REVIEW OF SYSTEMS
[Recent Weight Loss (___ Lbs)] : recent [unfilled] ~Ulb weight loss [Sleep Disturbances] : sleep disturbances [As Noted in HPI] : as noted in HPI [Memory Lapses or Loss] : memory loss [Decr. Concentrating Ability] : decreased concentrating ability [Repeating Questions] : repeated questioning about recent events [Poor Coordination] : poor coordination [Diarrhea] : diarrhea [Incontinence] : incontinence [Negative] : Heme/Lymph [Recent Weight Gain (___ Lbs)] : no recent weight gain [de-identified] : Hypersomnia [FreeTextEntry4] : snoring

## 2021-11-01 NOTE — REASON FOR VISIT
[Follow-Up: _____] : a [unfilled] follow-up visit [FreeTextEntry1] : for Cognitive assessment, also for mood disorder and tremors

## 2021-11-01 NOTE — DISCUSSION/SUMMARY
[FreeTextEntry1] : 81 -year-old male with PMHx of HTN, HLD, DM2 with c/o mild cognitive decline and slowing down both cognitively and of motor activity, takes longer time to finish tasks, son reports that he has had urinary incontinence since past 6 months.\par \par # Mild dementia; progressing: Cognitive test today reveals global cognitive score 84.8, a drop from 93.6 in 5/2019 ; > one SD below average noted in Memory, attention and executive function. .\par \par - continue donepezil 10 mg daily\par -  add memantine XR 7 mg daily\par \par # Mild depression\par \par - continue Escitalopram 5 mg daily, May consider increasing dose to 10 mg in 10-12 weeks, adverse effects discussed with the patient and his wife.\par \par # Very subtle parkinsonian features, no significant progression.\par \par - Have recommended periodic Q3 month follow-ups note evolution of symptoms\par - Patient advised about activities, should walk daily\par - Also recommended cognitive exercises and staying active in his physical and social active

## 2021-12-13 ENCOUNTER — APPOINTMENT (OUTPATIENT)
Dept: NEUROLOGY | Facility: CLINIC | Age: 81
End: 2021-12-13
Payer: MEDICARE

## 2021-12-13 VITALS
TEMPERATURE: 97.8 F | BODY MASS INDEX: 24.8 KG/M2 | SYSTOLIC BLOOD PRESSURE: 124 MMHG | HEIGHT: 67 IN | HEART RATE: 60 BPM | DIASTOLIC BLOOD PRESSURE: 73 MMHG | WEIGHT: 158 LBS

## 2021-12-13 PROCEDURE — 99213 OFFICE O/P EST LOW 20 MIN: CPT

## 2021-12-13 NOTE — REASON FOR VISIT
[Follow-Up: _____] : a [unfilled] follow-up visit [Spouse] : spouse [FreeTextEntry1] : for dementia, mood disorder and tremors

## 2021-12-13 NOTE — DATA REVIEWED
[de-identified] : 4/11/19; MRI of brain - normal [de-identified] : 5/10/19: Cognitive assessment ; global battery.\par Global Cognitive score: 93.6\par More than one SD below average in domains: visuospatial function 74.8, verbal function 81.3\par Below average in domains: Memory 85.4, attention 90.3,\par Above average in domains: executive func 107.6, information processing speed 100.3, motor skills 115.3 [de-identified] : 5/3/19: Total cholesterol 173, , HDL 45, , CMP, B12, TSH, vitamin D normal\par HbA1c 6.7

## 2021-12-13 NOTE — DISCUSSION/SUMMARY
[FreeTextEntry1] : 81 -year-old male with PMHx of HTN, HLD, DM2 with c/o mild cognitive decline and slowing down both cognitively and of motor activity, takes longer time to finish tasks, son reports that he has had urinary incontinence since past 6 months.\par \par # Mild dementia; progressing: Cognitive test today reveals global cognitive score 84.8, a drop from 93.6 in 5/2019 ; > one SD below average noted in Memory, attention and executive function. .\par \par - continue donepezil 10 mg daily\par - increase Memantine XR 14 mg daily\par \par # Mild depression\par \par - continue Escitalopram increasE dose to 10 mg, adverse effects discussed with the patient and his wife.\par \par # Very subtle parkinsonian features, no significant progression.\par \par - Have recommended periodic Q3 month follow-ups note evolution of symptoms\par - Patient advised about activities, should walk daily\par - Also recommended cognitive exercises and staying active in his physical and social active

## 2021-12-13 NOTE — REVIEW OF SYSTEMS
[Recent Weight Loss (___ Lbs)] : recent [unfilled] ~Ulb weight loss [Sleep Disturbances] : sleep disturbances [As Noted in HPI] : as noted in HPI [Memory Lapses or Loss] : memory loss [Decr. Concentrating Ability] : decreased concentrating ability [Repeating Questions] : repeated questioning about recent events [Poor Coordination] : poor coordination [Diarrhea] : diarrhea [Incontinence] : incontinence [Negative] : Heme/Lymph [Recent Weight Gain (___ Lbs)] : no recent weight gain [de-identified] : Hypersomnia [FreeTextEntry4] : snoring

## 2021-12-13 NOTE — HISTORY OF PRESENT ILLNESS
[FreeTextEntry1] : Pt is here for follow up visit today, last seen on 11/1/21. Patient's wife reports that he sleeps, has no motivation to do anything, rests, he was started on escitalopram 5 mg daily, at the last visit he had noted his mood is slight upbeat, however his wife reports that due to reasons unknown he is not taking it anymore\par \par He continues to have problems with short-term memory, cognitive testing was repeated at the last visit, drop in global cognitive score was noted compared to 2019, he was started on memantine XR 7 mg daily and advised to continue donepezil 10 mg daily

## 2021-12-15 ENCOUNTER — TRANSCRIPTION ENCOUNTER (OUTPATIENT)
Age: 81
End: 2021-12-15

## 2022-04-12 ENCOUNTER — RX RENEWAL (OUTPATIENT)
Age: 82
End: 2022-04-12

## 2022-06-20 ENCOUNTER — RX RENEWAL (OUTPATIENT)
Age: 82
End: 2022-06-20

## 2022-09-23 ENCOUNTER — APPOINTMENT (OUTPATIENT)
Dept: NEUROLOGY | Facility: CLINIC | Age: 82
End: 2022-09-23

## 2022-09-23 ENCOUNTER — NON-APPOINTMENT (OUTPATIENT)
Age: 82
End: 2022-09-23

## 2022-09-23 VITALS
WEIGHT: 178 LBS | TEMPERATURE: 97.8 F | DIASTOLIC BLOOD PRESSURE: 80 MMHG | BODY MASS INDEX: 30.02 KG/M2 | SYSTOLIC BLOOD PRESSURE: 119 MMHG | HEART RATE: 60 BPM | HEIGHT: 64.5 IN

## 2022-09-23 DIAGNOSIS — G47.9 SLEEP DISORDER, UNSPECIFIED: ICD-10-CM

## 2022-09-23 PROCEDURE — 99214 OFFICE O/P EST MOD 30 MIN: CPT

## 2022-09-23 RX ORDER — OMEPRAZOLE 20 MG/1
20 CAPSULE, DELAYED RELEASE ORAL
Qty: 28 | Refills: 0 | Status: DISCONTINUED | COMMUNITY
Start: 2021-02-09 | End: 2022-09-23

## 2022-09-23 RX ORDER — BISMUTH SUBSALICYLATE 525MG/15ML
525 SUSPENSION, ORAL (FINAL DOSE FORM) ORAL 4 TIMES DAILY
Qty: 3 | Refills: 0 | Status: DISCONTINUED | COMMUNITY
Start: 2021-02-09 | End: 2022-09-23

## 2022-09-23 RX ORDER — ASPIRIN 81 MG
81 TABLET, DELAYED RELEASE (ENTERIC COATED) ORAL
Refills: 0 | Status: DISCONTINUED | COMMUNITY
End: 2022-09-23

## 2022-09-23 RX ORDER — METRONIDAZOLE 500 MG/1
500 TABLET ORAL
Qty: 42 | Refills: 1 | Status: DISCONTINUED | COMMUNITY
Start: 2021-02-09 | End: 2022-09-23

## 2022-09-23 RX ORDER — PANTOPRAZOLE 40 MG/1
40 TABLET, DELAYED RELEASE ORAL DAILY
Qty: 90 | Refills: 3 | Status: DISCONTINUED | COMMUNITY
Start: 2021-02-04 | End: 2022-09-23

## 2022-09-23 RX ORDER — ECONAZOLE NITRATE 10 MG/G
1 CREAM TOPICAL
Qty: 60 | Refills: 0 | Status: DISCONTINUED | COMMUNITY
Start: 2020-07-15 | End: 2022-09-23

## 2022-09-23 RX ORDER — TETRACYCLINE HYDROCHLORIDE 500 MG/1
500 CAPSULE ORAL
Qty: 56 | Refills: 0 | Status: DISCONTINUED | COMMUNITY
Start: 2021-02-09 | End: 2022-09-23

## 2022-09-23 NOTE — DATA REVIEWED
[de-identified] : 4/11/19; MRI of brain - normal [de-identified] : 7/13/21: Cognitive assessment ; global battery \par Global Cognitive score: 84.8\par More than one SD below average in domains: Memory 82.9, executive func 81.5, attention 76.9 Below average in domains:  verbal function 97.1, information processing speed 95.6\par Above average in domains: None\par Visuospatial function, verbal function could not be assessed\par 5/10/19: Cognitive assessment ; global battery.\par Global Cognitive score: 93.6\par More than one SD below average in domains: visuospatial function 74.8, verbal function 81.3\par Below average in domains: Memory 85.4, attention 90.3,\par Above average in domains: executive func 107.6, information processing speed 100.3, motor skills 115.3 [de-identified] : 5/3/19: Total cholesterol 173, , HDL 45, , CMP, B12, TSH, vitamin D normal\par HbA1c 6.7

## 2022-09-23 NOTE — REVIEW OF SYSTEMS
[Recent Weight Loss (___ Lbs)] : recent [unfilled] ~Ulb weight loss [Sleep Disturbances] : sleep disturbances [As Noted in HPI] : as noted in HPI [Memory Lapses or Loss] : memory loss [Decr. Concentrating Ability] : decreased concentrating ability [Repeating Questions] : repeated questioning about recent events [Poor Coordination] : poor coordination [Diarrhea] : diarrhea [Incontinence] : incontinence [Negative] : Heme/Lymph [Recent Weight Gain (___ Lbs)] : no recent weight gain [de-identified] : Hypersomnia, snoring [FreeTextEntry4] : snoring

## 2022-09-23 NOTE — HISTORY OF PRESENT ILLNESS
[FreeTextEntry1] : Pt is here for follow up visit today, last seen on 12/13/21. Patient's wife reports that he sleeps a lot, at least 6-8 hours during daytime, at the last visit  escitalopram was increased to 10 mg daily, his mood is slight upbeat, but wife feels that sleepiness is more since increasing the dose of escitalopram\par \par Patient continues to have intermittent tremors of hands and forearms, he has not noted diminished dexterity of hand movements, his balance is slightly off, he is however able to walk and has not had any falls.  \par \par He continues to have problems with short-term memory, is on  memantine XR 14 mg daily and donepezil 10 mg daily.\par \par Patient snores a lot at night, he is not aware of it, his wife reports she can hear it, he denies visual hallucinations or nightmares.

## 2022-09-23 NOTE — DISCUSSION/SUMMARY
[FreeTextEntry1] : 82 -year-old M with PMHx of HTN, HLD, DM2 with c/o mild cognitive decline and slowing down both cognitively and of motor activity, takes longer time to finish tasks, son reports that he has had urinary incontinence since past 6 months.\par \par # Mild dementia; progressing: Cognitive test today reveals global cognitive score 84.8, a drop from 93.6 in 5/2019 ; > one SD below average noted in Memory, attention and executive function. .\par \par - continue donepezil 10 mg daily\par - increase Memantine XR 21 mg daily\par \par # Mild depression -hypersomnolence since increasing dose to 10 mgS\par \par - Will lower Escitalopram dose to 5 mg.\par \par # Subtle parkinsonian features, slight progression.\par \par - Have recommended periodic Q4 month follow-ups\par -Obtain DaTscan, patient's wife reports he is leaving for [place] in a week, will get it once they return in 4 months\par - Patient advised about activities, should walk daily\par - Also recommended cognitive exercises and staying active in his physical and social active\par \par # Hypersomnolence and snoring; rule out sleep apnea\par \par -We will obtain home sleep study once patient returns from [place]

## 2023-03-20 ENCOUNTER — RX RENEWAL (OUTPATIENT)
Age: 83
End: 2023-03-20

## 2023-05-04 ENCOUNTER — APPOINTMENT (OUTPATIENT)
Dept: NEUROLOGY | Facility: CLINIC | Age: 83
End: 2023-05-04
Payer: MEDICARE

## 2023-05-04 VITALS
WEIGHT: 170 LBS | TEMPERATURE: 97.8 F | BODY MASS INDEX: 28.67 KG/M2 | HEIGHT: 64.5 IN | HEART RATE: 57 BPM | SYSTOLIC BLOOD PRESSURE: 124 MMHG | DIASTOLIC BLOOD PRESSURE: 77 MMHG

## 2023-05-04 DIAGNOSIS — G47.10 HYPERSOMNIA, UNSPECIFIED: ICD-10-CM

## 2023-05-04 DIAGNOSIS — F03.90 UNSPECIFIED DEMENTIA W/OUT BEHAVIORAL DISTURBANCE: ICD-10-CM

## 2023-05-04 DIAGNOSIS — G20 PARKINSON'S DISEASE: ICD-10-CM

## 2023-05-04 PROCEDURE — 99214 OFFICE O/P EST MOD 30 MIN: CPT

## 2023-05-04 NOTE — DATA REVIEWED
[de-identified] : 4/11/19; MRI of brain - normal [de-identified] : 7/13/21: Cognitive assessment ; global battery \par Global Cognitive score: 84.8\par More than one SD below average in domains: Memory 82.9, executive func 81.5, attention 76.9 Below average in domains:  verbal function 97.1, information processing speed 95.6\par Above average in domains: None\par Visuospatial function, verbal function could not be assessed\par 5/10/19: Cognitive assessment ; global battery.\par Global Cognitive score: 93.6\par More than one SD below average in domains: visuospatial function 74.8, verbal function 81.3\par Below average in domains: Memory 85.4, attention 90.3,\par Above average in domains: executive func 107.6, information processing speed 100.3, motor skills 115.3 [de-identified] : 5/3/19: Total cholesterol 173, , HDL 45, , CMP, B12, TSH, vitamin D normal\par HbA1c 6.7

## 2023-05-04 NOTE — DISCUSSION/SUMMARY
[FreeTextEntry1] : 83 -year-old M with PMHx of HTN, HLD, DM2 with mildly evolving cognitive decline and slowing down of motor activity, takes longer time to finish tasks, has had urinary incontinence, has hypersomnolence and most recent episodic dysphagia.\par \par # Mild dementia; progressing: last cognitive test reveals global cognitive score 84.8, a drop from 93.6 in 5/2019 ; > one SD below average noted in Memory, attention and executive function. .\par \par - continue donepezil 10 mg daily\par - increase Memantine XR 21 mg daily\par - Also recommended cognitive exercises and staying active in his physical and social active\par \par # Subtle parkinsonian features, now with dysphagia and shuffling gait.\par \par - Will obtain DaTscan\par - Patient advised about activities, should walk daily\par \par # Hypersomnolence and snoring; rule out sleep apnea\par \par -We will obtain home sleep study \par \par # Mild depression \par \par - continue Escitalopram 5 mg.\par \par \par \par \par

## 2023-05-04 NOTE — REASON FOR VISIT
[Follow-Up: _____] : a [unfilled] follow-up visit [Spouse] : spouse [FreeTextEntry1] : For dementia, tremors, shuffling gait and sleepiness

## 2023-05-04 NOTE — PHYSICAL EXAM
[General Appearance - Alert] : alert [General Appearance - In No Acute Distress] : in no acute distress [Mood] : the mood was normal [Person] : oriented to person [Registration Intact] : recent registration memory intact [Span Intact] : the attention span was normal [Naming Objects] : no difficulty naming common objects [Repeating Phrases] : no difficulty repeating a phrase [Fluency] : fluency intact [Comprehension] : comprehension intact [Cranial Nerves Optic (II)] : visual acuity intact bilaterally,  visual fields full to confrontation, pupils equal round and reactive to light [Cranial Nerves Oculomotor (III)] : extraocular motion intact [Cranial Nerves Trigeminal (V)] : facial sensation intact symmetrically [Cranial Nerves Facial (VII)] : face symmetrical [Cranial Nerves Vestibulocochlear (VIII)] : hearing was intact bilaterally [Cranial Nerves Glossopharyngeal (IX)] : tongue and palate midline [Cranial Nerves Accessory (XI - Cranial And Spinal)] : head turning and shoulder shrug symmetric [Motor Strength] : muscle strength was normal in all four extremities [Sensation Tactile Decrease] : light touch was intact [Vibration Decrease Leg / Foot Both Ankles] : decreased at both ankles [Vibration Decrease Leg / Foot Toes Both Feet] : decreased at the toes of both feet  [2+] : Brachioradialis left 2+ [1+] : Patella left 1+ [0] : Ankle jerk left 0 [PERRL With Normal Accommodation] : pupils were equal in size, round, reactive to light, with normal accommodation [Extraocular Movements] : extraocular movements were intact [Full Visual Field] : full visual field [Hearing Threshold Finger Rub Not [place]] : hearing was normal [] : the neck was supple [Neck Cervical Mass (___cm)] : no neck mass was observed [Edema] : there was no peripheral edema [Place] : disoriented to place [Time] : disoriented to time [Short Term Intact] : short term memory impaired [Current Events] : inadequate knowledge of current events [Romberg's Sign] : Romberg's sign was negtive [FreeTextEntry1] : Subtle mask-like facies, no hypophonia or dysarthria, no axial muscle hypertonia.\par Mild-moderate hypertonia/rigidity left upper extremity, ? Bradykinesia left side, finger tapping equal.\par Left arm/hand hand tremor at rest\par Erect posture and normal gait, no asymmetry of arm swing [Arterial Pulses Carotid] : carotid pulses were normal with no bruits

## 2023-05-04 NOTE — HISTORY OF PRESENT ILLNESS
[FreeTextEntry1] : Pt is here for follow up visit today, last seen on 9/23/22. Patient's wife reports that they spent the entire winter months in [place], have just returned, she reports while they were in the they had been following up with home visits by the NP, patient also consulted a cardiologist as he had a syncopal episode, he had L IN Q implanted, he has also been followed up for prostrate CA and a skin lesion that was cancerous was excised.\par \par Patient's wife reports that patient continues to sleep a lot, he is also very inactive, he does not do much, just sits in one place, she reports it takes a lot of effort to get him to participate in any activities.  He continues to take escitalopram 5 mg daily, was unable to tolerate 10 MGs\par \par His wife also reports that he has been having difficulty swallowing at times, he was evaluated by an ENT specialist in [place], he had some swallowing studies done, has been advised to have thickened liquids.\par In addition, patient continues to walk with shuffling gait, he is using a cane for getting around, he has not had any falls, but continues to have intermittent tremors of hands and forearms.\par \par He continues to have problems with short-term memory, is on  memantine XR 14 mg daily and donepezil 10 mg daily.\par \par

## 2023-05-04 NOTE — REVIEW OF SYSTEMS
[Recent Weight Loss (___ Lbs)] : recent [unfilled] ~Ulb weight loss [Sleep Disturbances] : sleep disturbances [As Noted in HPI] : as noted in HPI [Memory Lapses or Loss] : memory loss [Decr. Concentrating Ability] : decreased concentrating ability [Repeating Questions] : repeated questioning about recent events [Poor Coordination] : poor coordination [Incontinence] : incontinence [Negative] : Heme/Lymph [Recent Weight Gain (___ Lbs)] : no recent weight gain [Fainting] : fainting [de-identified] : Hypersomnia, snoring [FreeTextEntry4] : snoring [FreeTextEntry7] : Dysphagia

## 2023-05-08 RX ORDER — PNV NO.95/FERROUS FUM/FOLIC AC 28MG-0.8MG
TABLET ORAL
Refills: 0 | Status: DISCONTINUED | COMMUNITY
End: 2023-05-08

## 2023-05-16 ENCOUNTER — APPOINTMENT (OUTPATIENT)
Dept: NEUROLOGY | Facility: CLINIC | Age: 83
End: 2023-05-16
Payer: MEDICARE

## 2023-05-16 PROCEDURE — 95806 SLEEP STUDY UNATT&RESP EFFT: CPT

## 2023-05-23 ENCOUNTER — NON-APPOINTMENT (OUTPATIENT)
Age: 83
End: 2023-05-23

## 2023-05-23 RX ORDER — IODINE/POTASSIUM IODIDE 5 %-10 %
20 SOLUTION, NON-ORAL TOPICAL ONCE
Refills: 0 | Status: DISCONTINUED | OUTPATIENT
Start: 2023-05-24 | End: 2023-06-07

## 2023-05-24 ENCOUNTER — OUTPATIENT (OUTPATIENT)
Dept: OUTPATIENT SERVICES | Facility: HOSPITAL | Age: 83
LOS: 1 days | End: 2023-05-24
Payer: MEDICARE

## 2023-05-24 DIAGNOSIS — F03.90 UNSPECIFIED DEMENTIA, UNSPECIFIED SEVERITY, WITHOUT BEHAVIORAL DISTURBANCE, PSYCHOTIC DISTURBANCE, MOOD DISTURBANCE, AND ANXIETY: ICD-10-CM

## 2023-05-24 PROCEDURE — A9584: CPT

## 2023-05-24 PROCEDURE — 78803 RP LOCLZJ TUM SPECT 1 AREA: CPT

## 2023-05-24 PROCEDURE — 78803 RP LOCLZJ TUM SPECT 1 AREA: CPT | Mod: 26,MH

## 2023-05-25 ENCOUNTER — NON-APPOINTMENT (OUTPATIENT)
Age: 83
End: 2023-05-25

## 2023-05-25 DIAGNOSIS — F03.90 UNSPECIFIED DEMENTIA WITHOUT BEHAVIORAL DISTURBANCE: ICD-10-CM

## 2023-07-11 ENCOUNTER — APPOINTMENT (OUTPATIENT)
Dept: NEUROLOGY | Facility: CLINIC | Age: 83
End: 2023-07-11
Payer: MEDICARE

## 2023-07-11 VITALS
SYSTOLIC BLOOD PRESSURE: 131 MMHG | HEIGHT: 64.5 IN | WEIGHT: 159 LBS | HEART RATE: 65 BPM | BODY MASS INDEX: 26.81 KG/M2 | DIASTOLIC BLOOD PRESSURE: 79 MMHG | TEMPERATURE: 97.6 F

## 2023-07-11 PROCEDURE — 99214 OFFICE O/P EST MOD 30 MIN: CPT

## 2023-07-11 NOTE — PHYSICAL EXAM
[General Appearance - Alert] : alert [General Appearance - In No Acute Distress] : in no acute distress [Mood] : the mood was normal [Person] : oriented to person [Registration Intact] : recent registration memory intact [Span Intact] : the attention span was normal [Naming Objects] : no difficulty naming common objects [Repeating Phrases] : no difficulty repeating a phrase [Fluency] : fluency intact [Comprehension] : comprehension intact [Cranial Nerves Optic (II)] : visual acuity intact bilaterally,  visual fields full to confrontation, pupils equal round and reactive to light [Cranial Nerves Trigeminal (V)] : facial sensation intact symmetrically [Cranial Nerves Oculomotor (III)] : extraocular motion intact [Cranial Nerves Facial (VII)] : face symmetrical [Cranial Nerves Vestibulocochlear (VIII)] : hearing was intact bilaterally [Cranial Nerves Glossopharyngeal (IX)] : tongue and palate midline [Cranial Nerves Accessory (XI - Cranial And Spinal)] : head turning and shoulder shrug symmetric [Motor Strength] : muscle strength was normal in all four extremities [Sensation Tactile Decrease] : light touch was intact [Vibration Decrease Leg / Foot Both Ankles] : decreased at both ankles [Vibration Decrease Leg / Foot Toes Both Feet] : decreased at the toes of both feet  [2+] : Brachioradialis left 2+ [1+] : Patella left 1+ [0] : Ankle jerk left 0 [PERRL With Normal Accommodation] : pupils were equal in size, round, reactive to light, with normal accommodation [Extraocular Movements] : extraocular movements were intact [Full Visual Field] : full visual field [Hearing Threshold Finger Rub Not [place]] : hearing was normal [] : the neck was supple [Neck Cervical Mass (___cm)] : no neck mass was observed [Edema] : there was no peripheral edema [Arterial Pulses Carotid] : carotid pulses were normal with no bruits [Place] : disoriented to place [Time] : disoriented to time [Short Term Intact] : short term memory impaired [Current Events] : inadequate knowledge of current events [Romberg's Sign] : Romberg's sign was negtive [FreeTextEntry1] : Subtle mask-like facies, no hypophonia or dysarthria, no axial muscle hypertonia.\par Mild-moderate hypertonia/rigidity left upper extremity, ? Bradykinesia left side, finger tapping equal.\par Left arm/hand hand tremor at rest\par Erect posture and normal gait, no asymmetry of arm swing

## 2023-07-11 NOTE — REVIEW OF SYSTEMS
[Recent Weight Loss (___ Lbs)] : recent [unfilled] ~Ulb weight loss [Sleep Disturbances] : sleep disturbances [As Noted in HPI] : as noted in HPI [Memory Lapses or Loss] : memory loss [Decr. Concentrating Ability] : decreased concentrating ability [Repeating Questions] : repeated questioning about recent events [Poor Coordination] : poor coordination [Fainting] : fainting [Incontinence] : incontinence [Negative] : Heme/Lymph [Recent Weight Gain (___ Lbs)] : no recent weight gain [de-identified] : Hypersomnia, snoring [FreeTextEntry4] : snoring [FreeTextEntry7] : Dysphagia

## 2023-07-11 NOTE — REASON FOR VISIT
[Follow-Up: _____] : a [unfilled] follow-up visit [Family Member] : family member [FreeTextEntry1] : For PD, dementia, HST result review

## 2023-07-11 NOTE — DISCUSSION/SUMMARY
[FreeTextEntry1] : 83 -year-old M with PMHx of HTN, HLD, DM2 with mildly evolving cognitive decline and slowing down of motor activity, takes longer time to finish tasks, has had urinary incontinence, has hypersomnolence and most recent episodic dysphagia.\par \par #Early Parkinson's disease: DaTscan positive\par \par -Start carbidopa levodopa 25/100 Mg p.o. twice daily x 2 weeks, increase to 3 times daily after that\par -Physical therapy for balance and gait training\par \par # Mild dementia; progressing: last cognitive test reveals global cognitive score 84.8, a drop from 93.6 in 5/2019 ; > one SD below average noted in Memory, attention and executive function. .\par \par - continue donepezil 10 mg daily\par -Memantine XR 21 mg daily\par - Also recommended cognitive exercises and staying active in his physical and social active\par \par # Mild sleep apnea\par \par -Consult with Dr. Renee to discuss treatment options for BENITA\par \par # Mild depression \par \par - continue Escitalopram 5 mg.\par \par \par \par \par

## 2023-07-11 NOTE — PHYSICAL EXAM
[General Appearance - Alert] : alert [General Appearance - In No Acute Distress] : in no acute distress [Mood] : the mood was normal [Person] : oriented to person [Registration Intact] : recent registration memory intact [Span Intact] : the attention span was normal [Naming Objects] : no difficulty naming common objects [Repeating Phrases] : no difficulty repeating a phrase [Fluency] : fluency intact [Comprehension] : comprehension intact [Cranial Nerves Optic (II)] : visual acuity intact bilaterally,  visual fields full to confrontation, pupils equal round and reactive to light [Cranial Nerves Oculomotor (III)] : extraocular motion intact [Cranial Nerves Trigeminal (V)] : facial sensation intact symmetrically [Cranial Nerves Facial (VII)] : face symmetrical [Cranial Nerves Vestibulocochlear (VIII)] : hearing was intact bilaterally [Cranial Nerves Glossopharyngeal (IX)] : tongue and palate midline [Cranial Nerves Accessory (XI - Cranial And Spinal)] : head turning and shoulder shrug symmetric [Motor Strength] : muscle strength was normal in all four extremities [Sensation Tactile Decrease] : light touch was intact [Vibration Decrease Leg / Foot Both Ankles] : decreased at both ankles [Vibration Decrease Leg / Foot Toes Both Feet] : decreased at the toes of both feet  [2+] : Brachioradialis left 2+ [1+] : Patella left 1+ [0] : Ankle jerk left 0 [PERRL With Normal Accommodation] : pupils were equal in size, round, reactive to light, with normal accommodation [Extraocular Movements] : extraocular movements were intact [Full Visual Field] : full visual field [Hearing Threshold Finger Rub Not [place]] : hearing was normal [] : the neck was supple [Neck Cervical Mass (___cm)] : no neck mass was observed [Arterial Pulses Carotid] : carotid pulses were normal with no bruits [Edema] : there was no peripheral edema [Place] : disoriented to place [Time] : disoriented to time [Short Term Intact] : short term memory impaired [Current Events] : inadequate knowledge of current events [Romberg's Sign] : Romberg's sign was negtive [FreeTextEntry1] : Subtle mask-like facies, no hypophonia or dysarthria, no axial muscle hypertonia.\par Mild-moderate hypertonia/rigidity left upper extremity, ? Bradykinesia left side, finger tapping equal.\par Left arm/hand hand tremor at rest\par Erect posture and normal gait, no asymmetry of arm swing

## 2023-07-11 NOTE — REVIEW OF SYSTEMS
[Recent Weight Loss (___ Lbs)] : recent [unfilled] ~Ulb weight loss [Sleep Disturbances] : sleep disturbances [As Noted in HPI] : as noted in HPI [Memory Lapses or Loss] : memory loss [Decr. Concentrating Ability] : decreased concentrating ability [Repeating Questions] : repeated questioning about recent events [Poor Coordination] : poor coordination [Fainting] : fainting [Incontinence] : incontinence [Negative] : Heme/Lymph [Recent Weight Gain (___ Lbs)] : no recent weight gain [de-identified] : Hypersomnia, snoring [FreeTextEntry4] : snoring [FreeTextEntry7] : Dysphagia

## 2023-07-11 NOTE — HISTORY OF PRESENT ILLNESS
[FreeTextEntry1] : Pt is here for follow up visit today, last seen on 5/4/23. Patient is accompanied by his son, patient had a DaTscan done that was consistent with Parkinson's disease/parkinsonism.  Patient was advised to start physical therapy, he has set up the appointment, is going to start within the next 1 to 2 days.  Patient's son reports that he continues to walk with shuffling gait, is using a cane for getting around, has not had any falls, he has intermittent tremors of hands, he has generally slowed down a lot\par \par Patient continues to sleep a lot, he is also very inactive, he does not do much, just sits in one place, she reports it takes a lot of effort to get him to participate in any activities.  He continues to take escitalopram 5 mg daily\par \par Pt has HST shows mild degree of BENITA (AHI 13.5). Patients oxygen saturation monitor was periodically disconnected which may underestimate events.\par \par He continues to have problems with short-term memory, is on  memantine XR 14 mg daily and donepezil 10 mg daily.\par \par \par \par

## 2023-07-11 NOTE — DATA REVIEWED
[de-identified] : 4/11/19; MRI of brain - normal [de-identified] : 5/21/23: HST: shows evidence of a mild degree of obstructive sleep apnea. AHI is 13.5. the patient's oxygen saturation monitor was periodically disconnected, which may lead to an underestimation of events.\par  [de-identified] : 5/24/23: Dina scan is positive for Parkinsons Disease \par  [de-identified] : 7/13/21: Cognitive assessment ; global battery \par Global Cognitive score: 84.8\par More than one SD below average in domains: Memory 82.9, executive func 81.5, attention 76.9 Below average in domains:  verbal function 97.1, information processing speed 95.6\par Above average in domains: None\par Visuospatial function, verbal function could not be assessed\par 5/10/19: Cognitive assessment ; global battery.\par Global Cognitive score: 93.6\par More than one SD below average in domains: visuospatial function 74.8, verbal function 81.3\par Below average in domains: Memory 85.4, attention 90.3,\par Above average in domains: executive func 107.6, information processing speed 100.3, motor skills 115.3 [de-identified] : 5/3/19: Total cholesterol 173, , HDL 45, , CMP, B12, TSH, vitamin D normal\par HbA1c 6.7

## 2023-07-11 NOTE — DATA REVIEWED
[de-identified] : 4/11/19; MRI of brain - normal [de-identified] : 5/21/23: HST: shows evidence of a mild degree of obstructive sleep apnea. AHI is 13.5. the patient's oxygen saturation monitor was periodically disconnected, which may lead to an underestimation of events.\par  [de-identified] : 5/24/23: Dina scan is positive for Parkinsons Disease \par  [de-identified] : 7/13/21: Cognitive assessment ; global battery \par Global Cognitive score: 84.8\par More than one SD below average in domains: Memory 82.9, executive func 81.5, attention 76.9 Below average in domains:  verbal function 97.1, information processing speed 95.6\par Above average in domains: None\par Visuospatial function, verbal function could not be assessed\par 5/10/19: Cognitive assessment ; global battery.\par Global Cognitive score: 93.6\par More than one SD below average in domains: visuospatial function 74.8, verbal function 81.3\par Below average in domains: Memory 85.4, attention 90.3,\par Above average in domains: executive func 107.6, information processing speed 100.3, motor skills 115.3 [de-identified] : 5/3/19: Total cholesterol 173, , HDL 45, , CMP, B12, TSH, vitamin D normal\par HbA1c 6.7

## 2023-09-14 ENCOUNTER — APPOINTMENT (OUTPATIENT)
Dept: NEUROLOGY | Facility: CLINIC | Age: 83
End: 2023-09-14
Payer: MEDICARE

## 2023-09-14 VITALS
HEIGHT: 64.5 IN | HEART RATE: 56 BPM | WEIGHT: 167 LBS | SYSTOLIC BLOOD PRESSURE: 117 MMHG | TEMPERATURE: 97.8 F | BODY MASS INDEX: 28.16 KG/M2 | DIASTOLIC BLOOD PRESSURE: 74 MMHG

## 2023-09-14 DIAGNOSIS — G47.33 OBSTRUCTIVE SLEEP APNEA (ADULT) (PEDIATRIC): ICD-10-CM

## 2023-09-14 PROCEDURE — 99214 OFFICE O/P EST MOD 30 MIN: CPT

## 2023-09-14 RX ORDER — SILDENAFIL 100 MG/1
100 TABLET, FILM COATED ORAL
Qty: 2 | Refills: 0 | Status: DISCONTINUED | COMMUNITY
Start: 2020-06-02 | End: 2023-09-14

## 2023-11-22 ENCOUNTER — APPOINTMENT (OUTPATIENT)
Dept: NEUROLOGY | Facility: CLINIC | Age: 83
End: 2023-11-22
Payer: MEDICARE

## 2023-11-22 DIAGNOSIS — F03.A0 UNSPECIFIED DEMENTIA, MILD, WITHOUT BEHAVIORAL DISTURBANCE, PSYCHOTIC DISTURBANCE, MOOD DISTURBANCE, AND ANXIETY: ICD-10-CM

## 2023-11-22 DIAGNOSIS — F32.A DEPRESSION, UNSPECIFIED: ICD-10-CM

## 2023-11-22 DIAGNOSIS — G20.A1 PARKINSON'S DISEASE WITHOUT DYSKINESIA, WITHOUT MENTION OF FLUCTUATIONS: ICD-10-CM

## 2023-11-22 PROCEDURE — 99213 OFFICE O/P EST LOW 20 MIN: CPT | Mod: 95

## 2023-11-22 RX ORDER — ESCITALOPRAM OXALATE 5 MG/1
5 TABLET ORAL DAILY
Qty: 90 | Refills: 1 | Status: DISCONTINUED | COMMUNITY
Start: 2021-07-13 | End: 2023-11-22

## 2023-11-22 RX ORDER — CARBIDOPA AND LEVODOPA 25; 100 MG/1; MG/1
25-100 TABLET ORAL
Qty: 270 | Refills: 1 | Status: ACTIVE | COMMUNITY
Start: 2023-07-11 | End: 1900-01-01

## 2023-12-18 RX ORDER — MEMANTINE HYDROCHLORIDE 21 MG/1
21 CAPSULE, EXTENDED RELEASE ORAL
Qty: 90 | Refills: 1 | Status: ACTIVE | COMMUNITY
Start: 2021-11-01 | End: 1900-01-01

## 2024-04-25 ENCOUNTER — TRANSCRIPTION ENCOUNTER (OUTPATIENT)
Age: 84
End: 2024-04-25

## 2024-07-02 ENCOUNTER — NON-APPOINTMENT (OUTPATIENT)
Age: 84
End: 2024-07-02

## 2024-07-03 ENCOUNTER — APPOINTMENT (OUTPATIENT)
Dept: NEUROLOGY | Facility: CLINIC | Age: 84
End: 2024-07-03
Payer: MEDICARE

## 2024-07-03 ENCOUNTER — NON-APPOINTMENT (OUTPATIENT)
Age: 84
End: 2024-07-03

## 2024-07-03 VITALS
HEIGHT: 64.5 IN | WEIGHT: 145 LBS | DIASTOLIC BLOOD PRESSURE: 70 MMHG | TEMPERATURE: 98.2 F | SYSTOLIC BLOOD PRESSURE: 110 MMHG | HEART RATE: 54 BPM | BODY MASS INDEX: 24.45 KG/M2

## 2024-07-03 DIAGNOSIS — E11.9 TYPE 2 DIABETES MELLITUS W/OUT COMPLICATIONS: ICD-10-CM

## 2024-07-03 DIAGNOSIS — F32.A DEPRESSION, UNSPECIFIED: ICD-10-CM

## 2024-07-03 DIAGNOSIS — F03.90 UNSPECIFIED DEMENTIA W/OUT BEHAVIORAL DISTURBANCE: ICD-10-CM

## 2024-07-03 DIAGNOSIS — G20.A1 PARKINSON'S DISEASE WITHOUT DYSKINESIA, WITHOUT MENTION OF FLUCTUATIONS: ICD-10-CM

## 2024-07-03 PROCEDURE — 99214 OFFICE O/P EST MOD 30 MIN: CPT

## 2024-07-03 RX ORDER — SERTRALINE 25 MG/1
25 TABLET, FILM COATED ORAL
Qty: 60 | Refills: 2 | Status: ACTIVE | COMMUNITY
Start: 2024-07-03 | End: 1900-01-01

## 2024-07-03 RX ORDER — TAMSULOSIN HYDROCHLORIDE 0.4 MG/1
0.4 CAPSULE ORAL AT BEDTIME
Refills: 0 | Status: ACTIVE | COMMUNITY

## 2024-07-23 ENCOUNTER — APPOINTMENT (OUTPATIENT)
Dept: NEUROLOGY | Facility: CLINIC | Age: 84
End: 2024-07-23

## 2024-09-04 ENCOUNTER — APPOINTMENT (OUTPATIENT)
Dept: GASTROENTEROLOGY | Facility: CLINIC | Age: 84
End: 2024-09-04
Payer: MEDICARE

## 2024-09-04 ENCOUNTER — RESULT REVIEW (OUTPATIENT)
Age: 84
End: 2024-09-04

## 2024-09-04 VITALS
WEIGHT: 145 LBS | DIASTOLIC BLOOD PRESSURE: 70 MMHG | HEIGHT: 64.5 IN | BODY MASS INDEX: 24.45 KG/M2 | SYSTOLIC BLOOD PRESSURE: 118 MMHG

## 2024-09-04 DIAGNOSIS — R63.4 ABNORMAL WEIGHT LOSS: ICD-10-CM

## 2024-09-04 DIAGNOSIS — Z71.9 COUNSELING, UNSPECIFIED: ICD-10-CM

## 2024-09-04 PROCEDURE — 99203 OFFICE O/P NEW LOW 30 MIN: CPT

## 2024-09-04 NOTE — ASSESSMENT
[FreeTextEntry1] : 85 yo male with history of weight loss most likely related to Parkinson's his dementia.  Patient has had poor p.o. intake.  Plan to obtain laboratory test and a CT scan to look for evidence of other pathology.  Patient and family would prefer to avoid invasive testing at this point.  Follow up in one month.  Patient and family advised to use supplementation.

## 2024-09-04 NOTE — REASON FOR VISIT
[Consultation] : a consultation visit [Family Member] : family member [FreeTextEntry1] : history of weight loss

## 2024-09-04 NOTE — PHYSICAL EXAM
[Alert] : alert [Normal Voice/Communication] : normal voice/communication [Healthy Appearing] : healthy appearing [No Acute Distress] : no acute distress [Hearing Threshold Finger Rub Not [place]] : hearing was normal [Sclera] : the sclera and conjunctiva were normal [Normal Lips/Gums] : the lips and gums were normal [Oropharynx] : the oropharynx was normal [Normal Appearance] : the appearance of the neck was normal [No Neck Mass] : no neck mass was observed [No Respiratory Distress] : no respiratory distress [No Acc Muscle Use] : no accessory muscle use [Respiration, Rhythm And Depth] : normal respiratory rhythm and effort [Auscultation Breath Sounds / Voice Sounds] : lungs were clear to auscultation bilaterally [Heart Rate And Rhythm] : heart rate was normal and rhythm regular [Normal S1, S2] : normal S1 and S2 [Murmurs] : no murmurs [Bowel Sounds] : normal bowel sounds [Abdomen Tenderness] : non-tender [No Masses] : no abdominal mass palpated [Abdomen Soft] : soft [] : no hepatosplenomegaly [Oriented To Time, Place, And Person] : oriented to person, place, and time [de-identified] : elderly male in NAD in a wheelchair

## 2024-09-04 NOTE — HISTORY OF PRESENT ILLNESS
[FreeTextEntry1] : Mr. KAMRAN PAZ is a 84 year old male with history of dementia and Parkinson's disease.  Patient was evaluated in 2020 for history of early satiety and weight loss.  He was found to have a gastric ulcer which was H. pylori positive.  He was treated successfully for H. pylori and has had no further upper abdominal discomfort.  Patient is being evaluated for dementia and has been on multiple medications.  Recently has had some progressive weight loss.  There has been no bowel changes.  Patient has had significantly decreased p.o. intake send as the dementia has progressed.

## 2024-09-05 LAB
ALBUMIN SERPL ELPH-MCNC: 4.1 G/DL
ALP BLD-CCNC: 82 U/L
ALT SERPL-CCNC: 8 U/L
ANION GAP SERPL CALC-SCNC: 14 MMOL/L
AST SERPL-CCNC: 24 U/L
BILIRUB SERPL-MCNC: 0.3 MG/DL
BUN SERPL-MCNC: 16 MG/DL
CALCIUM SERPL-MCNC: 9.5 MG/DL
CHLORIDE SERPL-SCNC: 104 MMOL/L
CO2 SERPL-SCNC: 24 MMOL/L
CREAT SERPL-MCNC: 0.89 MG/DL
EGFR: 84 ML/MIN/1.73M2
GLUCOSE SERPL-MCNC: 114 MG/DL
POTASSIUM SERPL-SCNC: 4.4 MMOL/L
PROT SERPL-MCNC: 6.3 G/DL
SODIUM SERPL-SCNC: 142 MMOL/L
T4 SERPL-MCNC: 9.4 UG/DL
TSH SERPL-ACNC: 0.71 UIU/ML
TTG IGA SER IA-ACNC: <0.5 U/ML
TTG IGA SER-ACNC: NEGATIVE
TTG IGG SER IA-ACNC: <0.8 U/ML
TTG IGG SER IA-ACNC: NEGATIVE

## 2024-09-10 ENCOUNTER — NON-APPOINTMENT (OUTPATIENT)
Age: 84
End: 2024-09-10

## 2024-09-10 ENCOUNTER — OUTPATIENT (OUTPATIENT)
Dept: OUTPATIENT SERVICES | Facility: HOSPITAL | Age: 84
LOS: 1 days | End: 2024-09-10
Payer: MEDICARE

## 2024-09-10 ENCOUNTER — APPOINTMENT (OUTPATIENT)
Dept: CT IMAGING | Facility: CLINIC | Age: 84
End: 2024-09-10
Payer: MEDICARE

## 2024-09-10 DIAGNOSIS — R63.4 ABNORMAL WEIGHT LOSS: ICD-10-CM

## 2024-09-10 PROCEDURE — 74177 CT ABD & PELVIS W/CONTRAST: CPT

## 2024-09-10 PROCEDURE — 74177 CT ABD & PELVIS W/CONTRAST: CPT | Mod: 26,MH

## 2024-09-11 ENCOUNTER — APPOINTMENT (OUTPATIENT)
Dept: NEUROLOGY | Facility: CLINIC | Age: 84
End: 2024-09-11
Payer: MEDICARE

## 2024-09-11 VITALS
DIASTOLIC BLOOD PRESSURE: 64 MMHG | HEIGHT: 65 IN | HEART RATE: 50 BPM | TEMPERATURE: 98.3 F | BODY MASS INDEX: 24.16 KG/M2 | WEIGHT: 145 LBS | SYSTOLIC BLOOD PRESSURE: 103 MMHG

## 2024-09-11 DIAGNOSIS — F03.90 UNSPECIFIED DEMENTIA W/OUT BEHAVIORAL DISTURBANCE: ICD-10-CM

## 2024-09-11 DIAGNOSIS — G20.A1 PARKINSON'S DISEASE WITHOUT DYSKINESIA, WITHOUT MENTION OF FLUCTUATIONS: ICD-10-CM

## 2024-09-11 DIAGNOSIS — F32.A DEPRESSION, UNSPECIFIED: ICD-10-CM

## 2024-09-11 PROCEDURE — 99214 OFFICE O/P EST MOD 30 MIN: CPT

## 2024-09-11 NOTE — PHYSICAL EXAM
[General Appearance - Alert] : alert [General Appearance - In No Acute Distress] : in no acute distress [Mood] : the mood was normal [Person] : oriented to person [Registration Intact] : recent registration memory intact [Span Intact] : the attention span was normal [Naming Objects] : no difficulty naming common objects [Repeating Phrases] : no difficulty repeating a phrase [Fluency] : fluency intact [Comprehension] : comprehension intact [Cranial Nerves Optic (II)] : visual acuity intact bilaterally,  visual fields full to confrontation, pupils equal round and reactive to light [Cranial Nerves Trigeminal (V)] : facial sensation intact symmetrically [Cranial Nerves Oculomotor (III)] : extraocular motion intact [Cranial Nerves Facial (VII)] : face symmetrical [Cranial Nerves Vestibulocochlear (VIII)] : hearing was intact bilaterally [Cranial Nerves Glossopharyngeal (IX)] : tongue and palate midline [Motor Strength] : muscle strength was normal in all four extremities [Cranial Nerves Accessory (XI - Cranial And Spinal)] : head turning and shoulder shrug symmetric [Sensation Tactile Decrease] : light touch was intact [Vibration Decrease Leg / Foot Both Ankles] : decreased at both ankles [Vibration Decrease Leg / Foot Toes Both Feet] : decreased at the toes of both feet  [2+] : Brachioradialis left 2+ [1+] : Patella left 1+ [0] : Ankle jerk left 0 [PERRL With Normal Accommodation] : pupils were equal in size, round, reactive to light, with normal accommodation [Extraocular Movements] : extraocular movements were intact [Full Visual Field] : full visual field [Hearing Threshold Finger Rub Not [place]] : hearing was normal [] : the neck was supple [Neck Cervical Mass (___cm)] : no neck mass was observed [Arterial Pulses Carotid] : carotid pulses were normal with no bruits [Edema] : there was no peripheral edema [Place] : disoriented to place [Time] : disoriented to time [Short Term Intact] : short term memory impaired [Current Events] : inadequate knowledge of current events [Romberg's Sign] : Romberg's sign was negtive [Coordination - Dysmetria Impaired Finger-to-Nose Bilateral] : not present [FreeTextEntry4] : not aware of season [FreeTextEntry1] : Subtle mask-like facies, no hypophonia or dysarthria, no axial muscle hypertonia.\par  Mild-moderate hypertonia/rigidity left upper extremity, ? Bradykinesia left side, finger tapping equal.\par  Left arm/hand hand tremor at rest\par  Erect posture and normal gait, no asymmetry of arm swing

## 2024-09-11 NOTE — DATA REVIEWED
[de-identified] : 4/11/19; MRI of brain - normal [de-identified] : 5/21/23: HST: shows evidence of a mild degree of obstructive sleep apnea. AHI is 13.5. the patient's oxygen saturation monitor was periodically disconnected, which may lead to an underestimation of events.\par   [de-identified] : 5/24/23: Dina scan is positive for Parkinsons Disease \par   [de-identified] : 7/13/21: Cognitive assessment ; global battery \par  Global Cognitive score: 84.8\par  More than one SD below average in domains: Memory 82.9, executive func 81.5, attention 76.9 Below average in domains:  verbal function 97.1, information processing speed 95.6\par  Above average in domains: None\par  Visuospatial function, verbal function could not be assessed\par  5/10/19: Cognitive assessment ; global battery.\par  Global Cognitive score: 93.6\par  More than one SD below average in domains: visuospatial function 74.8, verbal function 81.3\par  Below average in domains: Memory 85.4, attention 90.3,\par  Above average in domains: executive func 107.6, information processing speed 100.3, motor skills 115.3 [de-identified] : 5/3/19: Total cholesterol 173, , HDL 45, , CMP, B12, TSH, vitamin D normal\par  HbA1c 6.7

## 2024-09-11 NOTE — HISTORY OF PRESENT ILLNESS
[FreeTextEntry1] : Patient for follow-up visit, today, last telehealth visit on 7/3/24. Patient has returned from [place] in May, his wife who is accompanying him reports that he is less somnolent, was started on Zoloft, is tolerating it well, currently taking 50 mg daily..  Patient has been very sedentary, is taking carbidopa levodopa twice daily although was advised to increase the dose to thrice daily at the last visit.  Patient is cooperative and participates, but he has mildly progressive cognitive decline, he talks irrelevantly but has a pleasant demeanor, he has no concept of what month/season it is or about current events

## 2024-09-11 NOTE — REVIEW OF SYSTEMS
[Feeling Tired] : feeling tired [Sleep Disturbances] : sleep disturbances [Depression] : depression [Memory Lapses or Loss] : memory loss [Decr. Concentrating Ability] : decreased concentrating ability [Repeating Questions] : repeated questioning about recent events [Poor Coordination] : poor coordination [As Noted in HPI] : as noted in HPI [Constipation] : constipation [Incontinence] : incontinence [Negative] : Heme/Lymph [de-identified] : Hypersomnia, snoring [FreeTextEntry4] : snoring

## 2024-09-11 NOTE — REASON FOR VISIT
[Follow-Up: _____] : a [unfilled] follow-up visit [Spouse] : spouse [FreeTextEntry1] : For Parkinson disease and dementia

## 2024-09-25 ENCOUNTER — APPOINTMENT (OUTPATIENT)
Dept: GASTROENTEROLOGY | Facility: CLINIC | Age: 84
End: 2024-09-25

## 2024-12-11 ENCOUNTER — APPOINTMENT (OUTPATIENT)
Dept: GASTROENTEROLOGY | Facility: CLINIC | Age: 84
End: 2024-12-11
Payer: MEDICARE

## 2024-12-11 PROCEDURE — 99441: CPT | Mod: 93

## 2024-12-19 ENCOUNTER — TRANSCRIPTION ENCOUNTER (OUTPATIENT)
Age: 84
End: 2024-12-19

## 2025-03-01 ENCOUNTER — RX RENEWAL (OUTPATIENT)
Age: 85
End: 2025-03-01

## 2025-03-20 ENCOUNTER — APPOINTMENT (OUTPATIENT)
Dept: NEUROLOGY | Facility: CLINIC | Age: 85
End: 2025-03-20
Payer: MEDICARE

## 2025-03-20 VITALS
SYSTOLIC BLOOD PRESSURE: 101 MMHG | BODY MASS INDEX: 24.99 KG/M2 | HEIGHT: 65 IN | HEART RATE: 57 BPM | DIASTOLIC BLOOD PRESSURE: 63 MMHG | WEIGHT: 150 LBS

## 2025-03-20 DIAGNOSIS — F03.90 UNSPECIFIED DEMENTIA W/OUT BEHAVIORAL DISTURBANCE: ICD-10-CM

## 2025-03-20 DIAGNOSIS — F03.A0 UNSPECIFIED DEMENTIA, MILD, WITHOUT BEHAVIORAL DISTURBANCE, PSYCHOTIC DISTURBANCE, MOOD DISTURBANCE, AND ANXIETY: ICD-10-CM

## 2025-03-20 DIAGNOSIS — F32.A DEPRESSION, UNSPECIFIED: ICD-10-CM

## 2025-03-20 DIAGNOSIS — G20.A1 PARKINSON'S DISEASE WITHOUT DYSKINESIA, WITHOUT MENTION OF FLUCTUATIONS: ICD-10-CM

## 2025-03-20 PROCEDURE — 99214 OFFICE O/P EST MOD 30 MIN: CPT

## 2025-09-17 ENCOUNTER — RX RENEWAL (OUTPATIENT)
Age: 85
End: 2025-09-17